# Patient Record
Sex: MALE | Race: WHITE | NOT HISPANIC OR LATINO | Employment: FULL TIME | URBAN - METROPOLITAN AREA
[De-identification: names, ages, dates, MRNs, and addresses within clinical notes are randomized per-mention and may not be internally consistent; named-entity substitution may affect disease eponyms.]

---

## 2019-09-06 ENCOUNTER — HOSPITAL ENCOUNTER (EMERGENCY)
Facility: CLINIC | Age: 34
Discharge: HOME | End: 2019-09-06
Admitting: PHYSICIAN ASSISTANT
Payer: COMMERCIAL

## 2019-09-06 VITALS
WEIGHT: 175 LBS | HEIGHT: 72 IN | RESPIRATION RATE: 18 BRPM | BODY MASS INDEX: 23.7 KG/M2 | TEMPERATURE: 98.2 F | HEART RATE: 89 BPM | OXYGEN SATURATION: 100 % | SYSTOLIC BLOOD PRESSURE: 145 MMHG | DIASTOLIC BLOOD PRESSURE: 86 MMHG

## 2019-09-06 DIAGNOSIS — S81.811A LACERATION OF RIGHT LOWER EXTREMITY, INITIAL ENCOUNTER: ICD-10-CM

## 2019-09-06 PROCEDURE — 90471 IMMUNIZATION ADMIN: CPT

## 2019-09-06 PROCEDURE — 90715 TDAP VACCINE 7 YRS/> IM: CPT | Performed by: PHYSICIAN ASSISTANT

## 2019-09-06 PROCEDURE — 12001 RPR S/N/AX/GEN/TRNK 2.5CM/<: CPT

## 2019-09-06 PROCEDURE — 99283 EMERGENCY DEPT VISIT LOW MDM: CPT | Mod: 25

## 2019-09-06 PROCEDURE — 25000128 H RX IP 250 OP 636: Performed by: PHYSICIAN ASSISTANT

## 2019-09-06 RX ADMIN — CLOSTRIDIUM TETANI TOXOID ANTIGEN (FORMALDEHYDE INACTIVATED), CORYNEBACTERIUM DIPHTHERIAE TOXOID ANTIGEN (FORMALDEHYDE INACTIVATED), BORDETELLA PERTUSSIS TOXOID ANTIGEN (GLUTARALDEHYDE INACTIVATED), BORDETELLA PERTUSSIS FILAMENTOUS HEMAGGLUTININ ANTIGEN (FORMALDEHYDE INACTIVATED), BORDETELLA PERTUSSIS PERTACTIN ANTIGEN, AND BORDETELLA PERTUSSIS FIMBRIAE 2/3 ANTIGEN 0.5 ML: 5; 2; 2.5; 5; 3; 5 INJECTION, SUSPENSION INTRAMUSCULAR at 19:27

## 2019-09-06 ASSESSMENT — ENCOUNTER SYMPTOMS: WOUND: 1

## 2019-09-06 ASSESSMENT — MIFFLIN-ST. JEOR: SCORE: 1771.79

## 2019-09-06 NOTE — ED AVS SNAPSHOT
Emergency Department  [address] [place] [address]  Phone:  [phone]  Fax:  [phone]                                    Khoa Danielle   MRN: [id number]    Department:   Emergency Department   Date of Visit:  9/6/2019           After Visit Summary Signature Page    I have received my discharge instructions, and my questions have been answered. I have discussed any challenges I see with this plan with the nurse or doctor.    ..........................................................................................................................................  Patient/Patient Representative Signature      ..........................................................................................................................................  Patient Representative Print Name and Relationship to Patient    ..................................................               ................................................  Date                                   Time    ..........................................................................................................................................  Reviewed by Signature/Title    ...................................................              ..............................................  Date                                               Time          22EPIC Rev 08/18

## 2019-09-07 NOTE — DISCHARGE INSTRUCTIONS
Watch the area surrounding the wound for signs of infection which can include increased redness, drainage, fevers, or swelling. Inspect the area daily. No swimming or baths for the next 3-5 days, showering is ok. Go to primary in 10-14 days for stitches/staple removal. Apply antibacterial ointment such as bacitracin to the wound daily.     Discharge Instructions  Laceration (Cut)    You were seen today for a laceration (cut).  Your doctor examined your laceration for any problems such a buried foreign body (like glass, a splinter, or gravel), or injury to blood vessels, tendons, and nerves.  Your doctor may have also rinsed and/or scrubbed your laceration to help prevent an infection.  Your laceration may have been closed with glue, staples or sutures (stitches).      It may not be possible to find all problems with your laceration on the first visit, and we can't always prevent infections.  Antibiotics are only given when the benefit is more than the risk, and don't prevent all infections. Some lacerations are too high risk to close, and are left open to heal.  All lacerations, no matter how expertly repaired, will cause scarring.    Return to the Emergency Department right away if:  You have more redness, swelling, pain, drainage (pus), a bad smell, or red streaking from your laceration.    You have a fever of 101oF or more.  You have bleeding that you can t stop at home. If your cut starts to bleed, hold pressure on the bleeding area with a clean cloth or put pressure over the bandage.  If the bleeding doesn t stop after using constant pressure for 30 minutes, you should return to the Emergency Department for further treatment.  An area past the laceration is cool, pale, or blue compared with the other side, or has a slower return of color when squeezed.  Your dressing seems too tight or starts to get uncomfortable or painful.  You have loss of normal function or use of an area, such as being unable to straighten  "or bend a finger normally.  You have a numb area past the laceration.    Return to the Emergency Department or see your regular doctor if:  The laceration starts to come open.   You have something coming out of the cut or a feeling that there is something in the laceration.  Your wound will not heal, or keeps breaking open. There can always be glass, wood, dirt or other things in any wound.  They won t always show up, even on x-rays.  If a wound doesn t heal, this may be why, and it is important to follow-up with your regular doctor.    Home Care:  Take your dressing off in 12 hours, or as instructed by your doctor, to check your laceration. Remove the dressing sooner if it seems too tight or painful, or if it is getting numb, tingly, or pale past the dressing.  Gently wash your laceration 2 times a day with clean cloth and soap.   It is okay to shower, but do not let the laceration soak in water.    If your laceration was closed with wound adhesive or strips: pat it dry and leave it open to the air.   For all other repairs: after you wash your laceration, or at least 2 times a day, apply bacitracin or other antibiotic ointment to the laceration, then cover it with a Band-Aid  or gauze.  Keep the laceration clean. Wear gloves or other protective clothing if you are around dirt.    Follow-up:  You need to follow-up with your regular doctor in 10-14 days.  Your sutures or staples need to be removed in 10-14 days. Schedule an appointment with your regular doctor to have this done.    Scars:  To help minimize scarring:  Wear sunscreen over the healed laceration when out in the sun.  Massage the area regularly.  You may use Vitamin E oil.  Wait a year.  Most scars will start to fade within a year.    Probiotics: If you have been given an antibiotic, you may want to also take a probiotic pill or eat yogurt with live cultures. Probiotics have \"good bacteria\" to help your intestines stay healthy. Studies have shown that " probiotics help prevent diarrhea and other intestine problems (including C. diff infection) when you take antibiotics. You can buy these without a prescription in the pharmacy section of the store.     If you were given a prescription for medicine here today, be sure to read all of the information (including the package insert) that comes with your prescription.  This will include important information about the medicine, its side effects, and any warnings that you need to know about.  The pharmacist who fills the prescription can provide more information and answer questions you may have about the medicine.  If you have questions or concerns that the pharmacist cannot address, please call or return to the Emergency Department.     Opioid Medication Information    Pain medications are among the most commonly prescribed medicines, so we are including this information for all our patients. If you did not receive pain medication or get a prescription for pain medicine, you can ignore it.     You may have been given a prescription for an opioid (narcotic) pain medicine and/or have received a pain medicine while here in the Emergency Department. These medicines can make you drowsy or impaired. You must not drive, operate dangerous equipment, or engage in any other dangerous activities while taking these medications. If you drive while taking these medications, you could be arrested for DUI, or driving under the influence. Do not drink any alcohol while you are taking these medications.     Opioid pain medications can cause addiction. If you have a history of chemical dependency of any type, you are at a higher risk of becoming addicted to pain medications.  Only take these prescribed medications to treat your pain when all other options have been tried. Take it for as short a time and as few doses as possible. Store your pain pills in a secure place, as they are frequently stolen and provide a dangerous opportunity for  children or visitors in your house to start abusing these powerful medications. We will not replace any lost or stolen medicine.  As soon as your pain is better, you should flush all your remaining medication.     Many prescription pain medications contain Tylenol  (acetaminophen), including Vicodin , Tylenol #3 , Norco , Lortab , and Percocet .  You should not take any extra pills of Tylenol  if you are using these prescription medications or you can get very sick.  Do not ever take more than 3000 mg of acetaminophen in any 24 hour period.    All opioids tend to cause constipation. Drink plenty of water and eat foods that have a lot of fiber, such as fruits, vegetables, prune juice, apple juice and high fiber cereal.  Take a laxative if you don t move your bowels at least every other day. Miralax , Milk of Magnesia, Colace , or Senna  can be used to keep you regular.      Remember that you can always come back to the Emergency Department if you are not able to see your regular doctor in the amount of time listed above, if you get any new symptoms, or if there is anything that worries you.
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
## 2022-02-23 ENCOUNTER — VIRTUAL VISIT (OUTPATIENT)
Dept: UROLOGY | Facility: CLINIC | Age: 37
End: 2022-02-23
Payer: COMMERCIAL

## 2022-02-23 DIAGNOSIS — Z30.09 ENCOUNTER FOR VASECTOMY COUNSELING: Primary | ICD-10-CM

## 2022-02-23 PROCEDURE — 99202 OFFICE O/P NEW SF 15 MIN: CPT | Mod: GT | Performed by: UROLOGY

## 2022-02-23 NOTE — PROGRESS NOTES
Khoa Danielle is a 36 year old male who is being evaluated via a video visit.      Video-Visit Details    Type of service:  Video Visit    Video Start Time: 1:34 PM  Video End Time: 1:47 PM    Originating Location (pt. Location): work    Distant Location (provider location):  [facility] AND [facility]     Platform used for Video Visit: [employer]    CC: Desires sterilization, consult for vasectomy.    HPI: Khoa Danielle is a 36 year old male with 2 children, and he is intersted in getting a vasectomy for sterilization.      No voiding problems.  No history of  trauma.  No hematuria  Normal sexual function.  No history of bleeding problems.    PMH:   Past Medical History:   Diagnosis Date     DNS (deviated nasal septum)    history of septoplasty, ~10 years ago.  3rd molars out.    FAMILY HX:   Family History   Problem Relation Age of Onset     Heart Disease Paternal Grandfather         heart  attack and multiple bypass     Diabetes Paternal Grandfather      Hypertension Paternal Grandfather      Diabetes Maternal Grandfather      Alcohol/Drug Maternal Grandfather      Breast Cancer Maternal Grandmother       No history of coagulopathies.    ALLERGIES:    No Known Allergies    MEDS:   No prescription medications at this time.    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     REVIEW OF SYMPTOMS:   Denies testicular pain, ED, ejaculatory problems, rashes in the groin, easy bruising or bleeding.   No constitutional, eye, ENT, heart, lung, GI, musculoskeletal, skin, neurologic, psychiatric, endocrine or hematologic complaints.       GENERAL PHYSICAL EXAM:   Exam  General- Alert, oriented, nad.  Pleasant and conversant.  Eyes- anicteric, EOMI.  Resps- normal, non-labored.  No  cough  Abdomen-  nondistended.   exam- deferred.   Neurological - no tremors  Skin - no discoloration/ lesions noted  Psychiatric - no anxiety, alert & oriented.       The rest of a comprehensive physical examination is deferred due to video visit restrictions.       I discussed with him at length the risks and benefits of the procedure. He understands that this is a sterilization procedure, and not reversible contraception. He understands that reversals, while possible, are not guaranteed to work and fairly complex. I discussed with him the option of sperm cryopreservation.     I stressed that he continues to be fertile in the post-operative period, and that he should continue using other contraceptive methods, such as a condom, until he obtains a semen analysis and we review the results to confirm success of the procedure and infertility. I also stressed to him that recanalization and pregnancy can occur in about 1 per thousand cases, possibly more even after we clear him with a semen analysis showing no motile sperm. I counseled him on the eliezer-operative risks of bleeding, infection, pain.  I described to him post-vasectomy pain syndrome that can occur in about 1 to 2% of men undergoing vasectomy.     We also discussed recovery times (typically days if no complications) and post-operative care including use of ice packs, pain medication and wound care.    Plan:  Schedule vasectomy procedure in clinic.       Additional Coding Information:    Problems:  One acute uncomplicated illness or injury    Data Reviewed  Minimal or none    Level of risk:   low risk (e.g., OTC medication or observation, minor surgery without risks)    Time spent:  13 minutes spent on the date of the encounter doing chart review, history and exam, documentation and further activities as noted above. This included the video chat time above.

## 2022-03-13 ENCOUNTER — HEALTH MAINTENANCE LETTER (OUTPATIENT)
Age: 37
End: 2022-03-13

## 2022-03-23 ENCOUNTER — OFFICE VISIT (OUTPATIENT)
Dept: UROLOGY | Facility: CLINIC | Age: 37
End: 2022-03-23
Payer: COMMERCIAL

## 2022-03-23 VITALS — DIASTOLIC BLOOD PRESSURE: 75 MMHG | SYSTOLIC BLOOD PRESSURE: 123 MMHG | OXYGEN SATURATION: 98 % | HEART RATE: 70 BPM

## 2022-03-23 DIAGNOSIS — Z30.2 ENCOUNTER FOR VASECTOMY: Primary | ICD-10-CM

## 2022-03-23 PROCEDURE — 55250 REMOVAL OF SPERM DUCT(S): CPT | Performed by: UROLOGY

## 2022-03-23 NOTE — NURSING NOTE
Khoa Danielle's goals for this visit include:   Chief Complaint   Patient presents with     Sterilization       He requests these members of his care team be copied on today's visit information:     PCP: No Ref-Primary, Physician    Referring Provider:  Mick Aragon MD  [address]  [place] [address]    /75 (BP Location: Right arm, Patient Position: Sitting, Cuff Size: Adult Regular)   Pulse 70   SpO2 98%     Do you need any medication refills at today's visit?     Tanisha Rae LPN on 3/23/2022 at 3:27 PM

## 2022-03-23 NOTE — PROGRESS NOTES
Procedure: Vasectomy bilateral.   Anesthesia: Local lidocaine injection by surgeon.  Surgeon: GODFREY Aragon M.D.   Assistant:  Tanisha Rae    Description of procedure: After the patient received education material regarding vasectomy, including risks and benefits, we proceeded with obtaining consent and proceeded with the surgery. He understands that there is a risk of late failure from recanalization. He understands that he is fertile until he has completed one or more semen analyses and he is given clearance from us for unprotected intercourse.  He understands that we will contact regarding the results but it is his responsibility to make sure he is cleared before having unprotected intercourse.  I have discussed with him other risks including bleeding, infection, acute and possible long-term pain.    The right vas was ligated first.  This was done using the standard technique by grasping it with a three-finger  and lifting it up to the skin.  A small amount of lidocaine was infiltrated into the skin and vasal sheath.  The skin was punctured and dilated bluntly using the scalpel-less dissector.  The sheath was identified and a rigid clamp was passed around the vas which was then lifted out of the incision.  The vas was cleaned off from the deferential vessels and the sheath, and cautery was used to divide the vas between mosquitos.  A small segment was removed and discarded.  The lumen of the vas was cannulated to confirm its identity, and the lumens of both ends were cauterized.  Pen cautery was used sparingly/as needed for minor bleeders.  A facial interposition was then performed with a single suture of 4-0 Monocryl. The skin defect was closed with a 4-0 chromic interrupted suture after confirming adequate hemostasis.       We then turned our attention to the left side and a similar technique was performed. This involved division, excision of a segment, cautery of the lumens, and fascial  interposition.    There were no hematomas noted at the end of the procedure.  The patient tolerated the procedure well.    He was advised to return for a post vasectomy semen check in 2-3 months, and that he is not sterile and must continue to use contraception until we tell him otherwise (based on follow-up semen specimen(s)).    Plan:  -Post vasectomy semen analysis in 2-3 months   -ice packs to scrotum X 24h  -shower ok tomorrow  -OTC analgesics recommended     Ab RUFF

## 2022-03-23 NOTE — PATIENT INSTRUCTIONS
Vasectomy Post-Op Care Instructions     You may go home after the procedure is completed. There may be some pain in your groin for 3 or 4 days after the operation. Some blood or yellow liquid may ooze from the cuts on the outside. The area around the cuts may swell and bruise. The sutures will dissolve on their own and do not require removal by the physician.    The first 48 hours after the procedure are crucial to healing. Generally, you may feel very good the day after the procedure, but that does not mean it is time to go back to normal activities. Resuming normal activities too soon is likely to cause internal bleeding and lots of pain.      Your provider may advise the following ways to care for yourself after the procedure:    Put an ice bag or package of frozen peas covered by a thin towel over the scrotum after the procedure. Leave the ice on the area for 30 minutes and then take it off for 30 minutes. Do this off and on for at least 24 hours.      Avoid all heavy lifting (greater than 10 pounds) for at least one week.    Wear a jockstrap or tight-fitting underwear for approximately one week to support the scrotum (testicles) and help reduce discomfort.    Take a pain reliever, such as Acetaminophen (Tylenol) or Ibuprofen (Advil), for any pain after the procedure. Your provider may prescribe a stronger pain medicine if it is needed.    You should be able to return to work in 48 hours, but strenuous activity should be avoided for two weeks.    Do not submerge the incision for 1 week following the procedure.    Ejaculation should be avoided for one week to allow the area to heal.    Follow-Up    You will need to have a negative post vasectomy analyses (no sperm seen) before you can discontinue birth control.    Semen Analysis   Schedule the post-vasectomy semen analysis three months after your vasectomy. You will need to ejaculate at least 20 times between your surgery and the first sample. Clinic staff will  contact your regarding your results via phone.    You will be given a container after the procedure. Collect the specimen at home by masturbation only (no lubrication, powders, saliva, or intercourse can be used) and bring it to the laboratory. You must have an appointment to drop off your specimen. The specimen needs to be delivered to the lab within 30-45 minutes.    Call [phone] with questions. For concerns or questions after hours or on weekends, please page the Urology Resident on-call: [phone].

## 2022-06-27 ENCOUNTER — TELEPHONE (OUTPATIENT)
Dept: UROLOGY | Facility: CLINIC | Age: 37
End: 2022-06-27

## 2022-06-27 ENCOUNTER — LAB (OUTPATIENT)
Dept: LAB | Facility: CLINIC | Age: 37
End: 2022-06-27
Payer: COMMERCIAL

## 2022-06-27 DIAGNOSIS — Z30.2 ENCOUNTER FOR VASECTOMY: Primary | ICD-10-CM

## 2022-06-27 DIAGNOSIS — Z30.2 ENCOUNTER FOR VASECTOMY: ICD-10-CM

## 2022-06-27 LAB — SEMEN ANALYSIS P VAS PNL: NORMAL

## 2022-06-27 PROCEDURE — 89321 SEMEN ANAL SPERM DETECTION: CPT

## 2022-06-27 NOTE — TELEPHONE ENCOUNTER
[facility] Call Center    Phone Message    May a detailed message be left on voicemail: yes     Reason for Call: Other: Satim called as there are questions regarding the semen analysis orders. Please give Satim a call to discuss     Action Taken: Message routed to:  Other: URO    Travel Screening: Not Applicable

## 2022-06-27 NOTE — TELEPHONE ENCOUNTER
Per lab, patient dropped off sample. Which is not how to do ROLAND semen analysis. Order placed for post vasectomy semen analysis.    Tanisha Rae LPN on 6/27/2022 at 3:48 PM

## 2022-06-30 NOTE — RESULT ENCOUNTER NOTE
Dear Devonte,     You are cleared for intercourse post-vasectomy.    Your semen analysis showed zero sperm.    Thank You!   Let me know if you have any questions.    Ab RUFF

## 2023-01-14 ENCOUNTER — HEALTH MAINTENANCE LETTER (OUTPATIENT)
Age: 38
End: 2023-01-14

## 2023-04-23 ENCOUNTER — HEALTH MAINTENANCE LETTER (OUTPATIENT)
Age: 38
End: 2023-04-23

## 2023-04-24 ASSESSMENT — ENCOUNTER SYMPTOMS
FREQUENCY: 0
NERVOUS/ANXIOUS: 0
ARTHRALGIAS: 0
FEVER: 0
NAUSEA: 0
HEMATOCHEZIA: 0
DYSURIA: 0
HEMATURIA: 0
PARESTHESIAS: 0
JOINT SWELLING: 0
HEADACHES: 0
MYALGIAS: 1
EYE PAIN: 0
HEARTBURN: 0
WEAKNESS: 0
ABDOMINAL PAIN: 0
PALPITATIONS: 0
DIZZINESS: 0
DIARRHEA: 0
SORE THROAT: 0
SHORTNESS OF BREATH: 0
CONSTIPATION: 0
CHILLS: 0
COUGH: 0

## 2023-04-26 ENCOUNTER — OFFICE VISIT (OUTPATIENT)
Dept: FAMILY MEDICINE | Facility: CLINIC | Age: 38
End: 2023-04-26
Payer: COMMERCIAL

## 2023-04-26 VITALS
WEIGHT: 179.5 LBS | OXYGEN SATURATION: 99 % | DIASTOLIC BLOOD PRESSURE: 66 MMHG | HEART RATE: 75 BPM | TEMPERATURE: 98.7 F | RESPIRATION RATE: 16 BRPM | SYSTOLIC BLOOD PRESSURE: 126 MMHG | HEIGHT: 72 IN | BODY MASS INDEX: 24.31 KG/M2

## 2023-04-26 DIAGNOSIS — Z13.220 SCREENING FOR CHOLESTEROL LEVEL: ICD-10-CM

## 2023-04-26 DIAGNOSIS — Z13.1 SCREENING FOR DIABETES MELLITUS: ICD-10-CM

## 2023-04-26 DIAGNOSIS — M79.661 PAIN OF RIGHT LOWER LEG: ICD-10-CM

## 2023-04-26 DIAGNOSIS — Z00.00 ENCOUNTER FOR ANNUAL PHYSICAL EXAM: Primary | ICD-10-CM

## 2023-04-26 PROCEDURE — 99213 OFFICE O/P EST LOW 20 MIN: CPT | Mod: 25

## 2023-04-26 PROCEDURE — 99385 PREV VISIT NEW AGE 18-39: CPT

## 2023-04-26 ASSESSMENT — ENCOUNTER SYMPTOMS
SHORTNESS OF BREATH: 0
HEARTBURN: 0
HEADACHES: 0
FEVER: 0
DIZZINESS: 0
EYE PAIN: 0
HEMATURIA: 0
CONSTIPATION: 0
JOINT SWELLING: 0
MYALGIAS: 1
ABDOMINAL PAIN: 0
CHILLS: 0
DYSURIA: 0
DIARRHEA: 0
ARTHRALGIAS: 0
WEAKNESS: 0
SORE THROAT: 0
FREQUENCY: 0
PARESTHESIAS: 0
NAUSEA: 0
PALPITATIONS: 0
HEMATOCHEZIA: 0
NERVOUS/ANXIOUS: 0
COUGH: 0

## 2023-04-26 ASSESSMENT — PAIN SCALES - GENERAL: PAINLEVEL: NO PAIN (0)

## 2023-04-26 NOTE — PROGRESS NOTES
SUBJECTIVE:   CC: Devonte is an 37 year old who presents for preventative health visit.       4/26/2023     4:29 PM   Additional Questions   Roomed by Alejandra   Accompanied by Self     Patient has been advised of split billing requirements and indicates understanding: Yes  Healthy Habits:     Getting at least 3 servings of Calcium per day:  Yes    Bi-annual eye exam:  NO    Dental care twice a year:  Yes    Sleep apnea or symptoms of sleep apnea:  None    Diet:  Regular (no restrictions)    Frequency of exercise:  4-5 days/week    Duration of exercise:  15-30 minutes    Taking medications regularly:  Yes    Medication side effects:  None    PHQ-2 Total Score: 0    Additional concerns today:  No    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 Likes the flexibility.  Family: 10 - year- Leilani-teacher. 2 children- 3 yo, 5 yo.  Exercise: running, cycling, walking around- hiking.  Diet: picky 6 year old. Bonner foods. 5 meals/day. Tries to get vegetables.    Today's PHQ-2 Score:       4/24/2023    12:05 PM   PHQ-2 ( 1999 [employer])   Q1: Little interest or pleasure in doing things 0   Q2: Feeling down, depressed or hopeless 0   PHQ-2 Score 0   Q1: Little interest or pleasure in doing things Not at all    Not at all   Q2: Feeling down, depressed or hopeless Not at all    Not at all   PHQ-2 Score 0    0       Have you ever done Advance Care Planning? (For example, a Health Directive, POLST, or a discussion with a medical provider or your loved ones about your wishes): No, advance care planning information given to patient to review.  Patient declined advance care planning discussion at this time.    Social History     Tobacco Use     Smoking status: Never     Passive exposure: Never     Smokeless tobacco: Never   Vaping Use     Vaping status: Never Used     Passive vaping exposure: Yes   Substance Use Topics     Alcohol use: Yes     Comment: occasionally             4/24/2023    12:05 PM   Alcohol Use   Prescreen: >3  drinks/day or >7 drinks/week? No          View : No data to display.                Last PSA: No results found for: PSA    Reviewed and updated as needed this visit by clinical staff   Tobacco  Allergies  Meds              Reviewed and updated as needed this visit by Provider      Review of Systems   Constitutional: Negative for chills and fever.   HENT: Positive for congestion. Negative for ear pain, hearing loss and sore throat.    Eyes: Negative for pain and visual disturbance.   Respiratory: Negative for cough and shortness of breath.    Cardiovascular: Negative for chest pain, palpitations and peripheral edema.   Gastrointestinal: Negative for abdominal pain, constipation, diarrhea, heartburn, hematochezia and nausea.   Genitourinary: Negative for dysuria, frequency, genital sores, hematuria, impotence, penile discharge and urgency.   Musculoskeletal: Positive for myalgias. Negative for arthralgias and joint swelling.   Skin: Negative for rash.   Neurological: Negative for dizziness, weakness, headaches and paresthesias.   Psychiatric/Behavioral: Negative for mood changes. The patient is not nervous/anxious.      OBJECTIVE:   /66   Pulse 75   Temp 98.7  F (37.1  C)   Resp 16   Ht 1.829 m (6')   Wt 81.4 kg (179 lb 8 oz)   SpO2 99%   BMI 24.34 kg/m      Physical Exam  GENERAL: healthy, alert and no distress  EYES: Eyes grossly normal to inspection, PERRL and conjunctivae and sclerae normal  HENT: ear canals and TM's normal, nose and mouth without ulcers or lesions  NECK: no adenopathy, no asymmetry, masses, or scars and thyroid normal to palpation  RESP: lungs clear to auscultation - no rales, rhonchi or wheezes  CV: regular rate and rhythm, normal S1 S2, no S3 or S4, no murmur, click or rub, no peripheral edema and peripheral pulses strong  ABDOMEN: soft, nontender, no hepatosplenomegaly, no masses and bowel sounds normal  MS: no gross musculoskeletal defects noted, no edema  SKIN: no suspicious  lesions or rashes  NEURO: Normal strength and tone, mentation intact and speech normal  PSYCH: mentation appears normal, affect normal/bright      ASSESSMENT/PLAN:   (Z00.00) Encounter for annual physical exam  (primary encounter diagnosis)  Discussed appropriate patient goal, which is for him to be able to maintain activity despite his chronic right leg pain    (Z13.220) Screening for cholesterol level  Plan: Lipid panel reflex to direct LDL Fasting    (Z13.1) Screening for diabetes mellitus  Plan: Glucose    (M79.661) Pain of right lower leg  Plan: Spine  Referral, XR Lumbar Spine 2/3         Views      Patient has been advised of split billing requirements and indicates understanding: Yes      COUNSELING:   Reviewed preventive health counseling, as reflected in patient instructions       Regular exercise       Healthy diet/nutrition        He reports that he has never smoked. He has never been exposed to tobacco smoke. He has never used smokeless tobacco.      Harley Lundberg NP  [facility]

## 2023-04-26 NOTE — PATIENT INSTRUCTIONS
Intracardiac echocardiography catheter removed intact.   Schedule your scan with radiology: [phone]

## 2023-05-01 ENCOUNTER — LAB (OUTPATIENT)
Dept: LAB | Facility: CLINIC | Age: 38
End: 2023-05-01
Payer: COMMERCIAL

## 2023-05-01 ENCOUNTER — HOSPITAL ENCOUNTER (OUTPATIENT)
Dept: GENERAL RADIOLOGY | Facility: CLINIC | Age: 38
Discharge: HOME | End: 2023-05-01
Payer: COMMERCIAL

## 2023-05-01 DIAGNOSIS — Z13.220 SCREENING FOR CHOLESTEROL LEVEL: ICD-10-CM

## 2023-05-01 DIAGNOSIS — M79.661 PAIN OF RIGHT LOWER LEG: ICD-10-CM

## 2023-05-01 DIAGNOSIS — Z13.1 SCREENING FOR DIABETES MELLITUS: ICD-10-CM

## 2023-05-01 LAB
CHOLEST SERPL-MCNC: 200 MG/DL
FASTING STATUS PATIENT QL REPORTED: YES
GLUCOSE SERPL-MCNC: 109 MG/DL (ref 70–99)
HDLC SERPL-MCNC: 50 MG/DL
LDLC SERPL CALC-MCNC: 118 MG/DL
NONHDLC SERPL-MCNC: 150 MG/DL
TRIGL SERPL-MCNC: 162 MG/DL

## 2023-05-01 PROCEDURE — 72100 X-RAY EXAM L-S SPINE 2/3 VWS: CPT

## 2023-05-01 PROCEDURE — 80061 LIPID PANEL: CPT

## 2023-05-01 PROCEDURE — 82947 ASSAY GLUCOSE BLOOD QUANT: CPT

## 2023-05-01 PROCEDURE — 36415 COLL VENOUS BLD VENIPUNCTURE: CPT

## 2023-05-08 ENCOUNTER — OFFICE VISIT (OUTPATIENT)
Dept: PHYSICAL MEDICINE AND REHAB | Facility: CLINIC | Age: 38
End: 2023-05-08
Payer: COMMERCIAL

## 2023-05-08 VITALS
DIASTOLIC BLOOD PRESSURE: 79 MMHG | RESPIRATION RATE: 16 BRPM | SYSTOLIC BLOOD PRESSURE: 130 MMHG | OXYGEN SATURATION: 99 % | HEART RATE: 64 BPM | TEMPERATURE: 98.6 F

## 2023-05-08 DIAGNOSIS — M79.661 PAIN OF RIGHT LOWER LEG: Primary | ICD-10-CM

## 2023-05-08 PROCEDURE — 99203 OFFICE O/P NEW LOW 30 MIN: CPT | Performed by: PHYSICAL MEDICINE & REHABILITATION

## 2023-05-08 ASSESSMENT — PAIN SCALES - GENERAL: PAINLEVEL: MILD PAIN (3)

## 2023-05-08 NOTE — PROGRESS NOTES
Patient seen at the request of Harley Lundberg NP for an opinion and evaluation of right leg pain.      HISTORY OF PRESENT ILLNESS:  Khoa Danielle is a 37 year old male who presents with a chief complaint of leg pain.     Pain began many years ago disease and running since middle school and developed pain in his legs.  He has been an avid endurance runner and athlete.  He participated in a triathlon/Ironman about 10 years ago.  He states that he has had chronic, ongoing pain affecting his right> left leg when he runs.  Additional management has included multiple courses of physical therapy, chiropractic care, and evaluation by a podiatrist.  He has tried prior PT through [facility]. he is also scheduled to start physical therapy tomorrow through [facility] in [place] with Marjorie Liu. He had an Xray lumbar spine which showed mild L5-S1 disc height loss and had some questions/concerns regarding that.     The pain is localized to the right >>left (90:10%) anterior shin at the proximal tibialis anterior muscle- he reports pain travelling along the IT band/lateral thigh down to the level of the leg; he denies numbness/tingling; described as dull and aching in nature. Pain is reported as 2/10 at rest today and up to 4/10 at worst in the last week. Symptoms are worse notably with running; and improved with rest. He does not report pain-related sleep disturbance.     Functional limitations: Can run up to 4miles before needing to sit or rest due to pain but has pain that develops almost immediately on running. He has tried multiple different types of shoes. He would like to train for trail half marathons. He likes to cross country ski. Does yoga which he can tolerate fine.     PRIOR INJURIES/TREATMENT:   Ice/Heat: none  Brace: None  Physical Therapy:   [facility]    Starting [facility] with Joceline Orantes      - Current Pain Medications -    None    - Prior/Trialed Pain Medications -   Tylenol prn  OTC NSAIDs prn     Prior Procedures:  Date    Procedure   Improvement (%)  None              Prior Related Surgery: None   Other (acupuncture, OMT, CMM, TENS, DME, etc.):   Massage therapy with some relief   Chiropractor helps occasionally with some adjustments       IMAGING - reviewed   05/01/23 - Mild L5-S1 disc height loss.     Review Of Systems:  I am responding to those symptoms which are directly relevant to the specific indication for my consultation. I recommend that the patient follow up with their primary or referring provider to pursue any other symptoms which may be of concern.       Medical History:  He  has a past medical history of DNS (deviated nasal septum).     He  has a past surgical history that includes molar extractions; Septoplasty, turbinoplasty, combined (07/29/2013); and vasectomy.    Family History  His family history includes Alcohol/Drug in his maternal grandfather; Breast Cancer in his maternal grandmother; Diabetes in his maternal grandfather and paternal grandfather; Heart Disease in his paternal grandfather; Hyperlipidemia in his mother; Hypertension in his paternal grandfather.      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   Current living situation: Lives with wife. x2 kids (4yo and 7yo)  He  reports that he has never smoked. He has never been exposed to tobacco smoke. He has never used smokeless tobacco. He reports current alcohol use. He reports that he does not use drugs.        Current Medications:   He currently has no medications in their medication list.     Allergies:   No Known Allergies    PHYSICAL EXAMINATION:  /79   Pulse 64   Temp 98.6  F (37  C)   Resp 16   SpO2 99%    General: Pleasant, straightforward, WDWN individual.  Mental Status: Pleasant, direct, appropriate mood and affect  Resp: breathing is unlabored without audible wheeze  Vascular: No visible cyanosis, no venous stasis  changes  Heme: No visible ecchymosis or erythema on extremities  Skin: No notable rash    Neurologic:  Strength: All major muscle groups of the bilateral lower extremities have normal and symmetric muscle strength     Sensation: SILT in lower extremities bilaterally L3-S1     DTRs: bilateral lower extremity stretch reflexes are equal and symmetric   No Clonus     Gait: reveals normal pat and stride    Musculoskeletal:  On dual leg/neutral stance and when laying supine in-toeing and internal hip rotation noted.     Full lumbar ROM. No gross pelvic obliquity. Facet loading equivocal. Non-tender to palpation in the lumbar spine. Negative SLR    Right Hip ROM full and painfree. Negative Obers bilaterally. Non tender along ITB or greater troch        Right SI joint maneuvers: TTP SIJ (sacral sulcus/PSIS), Celso (pt pointing) pos, Anjum neg, Yeoman's neg    ASSESSMENT:  Khoa Danielle is a pleasant 37 year old male who presents with to the [facility] for chronic right leg pain with exercise/exertion - notably running. He does have some mechanical symptoms of low back pain that are relatively non-specific and not as pervasive. X-ray findings are not particularly significant for DDD but unclear if there is any stenosis impingement that could further contribute to radicular pain in the lower extremities.     PLAN:  - PT scheduled at [facility] tomorrow   - Reviewed and interpreted Xray with patient  - Can consider additional imaging with MRI lumbar spine depending on his response to PT. If no clear spine generator may benefit from additional sports medicine evaluation.   - RTC x4-6 weeks of compliance w/ PT program and HEP.     Ready to learn, no apparent learning barriers.  Education provided on treatment plan according to patient's preferred learning style.  Patient verbalizes understanding.   __________________________________  Edgard Menendez MD  Physical Medicine & Rehabilitation        40 minutes  spent by me on the date of the encounter doing chart review, review of outside records, review of test results, interpretation of tests, patient visit and documentation

## 2023-05-08 NOTE — LETTER
5/8/2023       RE: Khoa Danielle  [address]  [place] [address]       Dear Colleague,    Thank you for referring your patient, Khoa Danielle, to the [facility] [place] at [facility]. Please see a copy of my visit note below.    Patient seen at the request of Harley Lundberg NP for an opinion and evaluation of right leg pain.      HISTORY OF PRESENT ILLNESS:  Khoa Danielle is a 37 year old male who presents with a chief complaint of leg pain.     Pain began many years ago disease and running since middle school and developed pain in his legs.  He has been an avid endurance runner and athlete.  He participated in a triathlon/Ironman about 10 years ago.  He states that he has had chronic, ongoing pain affecting his right> left leg when he runs.  Additional management has included multiple courses of physical therapy, chiropractic care, and evaluation by a podiatrist.  He has tried prior PT through [facility]. he is also scheduled to start physical therapy tomorrow through [facility] in [place] with Marjorie Liu. He had an Xray lumbar spine which showed mild L5-S1 disc height loss and had some questions/concerns regarding that.     The pain is localized to the right >>left (90:10%) anterior shin at the proximal tibialis anterior muscle- he reports pain travelling along the IT band/lateral thigh down to the level of the leg; he denies numbness/tingling; described as dull and aching in nature. Pain is reported as 2/10 at rest today and up to 4/10 at worst in the last week. Symptoms are worse notably with running; and improved with rest. He does not report pain-related sleep disturbance.     Functional limitations: Can run up to 4miles before needing to sit or rest due to pain but has pain that develops almost immediately on running. He has tried multiple different types of shoes.  He would like to train for trail half marathons. He likes to cross country ski. Does yoga which he can tolerate fine.     PRIOR INJURIES/TREATMENT:   Ice/Heat: none  Brace: None  Physical Therapy:   Remote PT - multiple sessions through [facility], [facility],    Starting PT [facility] with Joceline Orantes      - Current Pain Medications -   None    - Prior/Trialed Pain Medications -   Tylenol prn  OTC NSAIDs prn     Prior Procedures:  Date    Procedure   Improvement (%)  None              Prior Related Surgery: None   Other (acupuncture, OMT, CMM, TENS, DME, etc.):   Massage therapy with some relief   Chiropractor helps occasionally with some adjustments       IMAGING - reviewed   05/01/23 - Mild L5-S1 disc height loss.     Review Of Systems:  I am responding to those symptoms which are directly relevant to the specific indication for my consultation. I recommend that the patient follow up with their primary or referring provider to pursue any other symptoms which may be of concern.       Medical History:  He  has a past medical history of DNS (deviated nasal septum).     He  has a past surgical history that includes molar extractions; Septoplasty, turbinoplasty, combined (07/29/2013); and vasectomy.    Family History  His family history includes Alcohol/Drug in his maternal grandfather; Breast Cancer in his maternal grandmother; Diabetes in his maternal grandfather and paternal grandfather; Heart Disease in his paternal grandfather; Hyperlipidemia in his mother; Hypertension in his paternal grandfather.      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   Current living situation: Lives with wife. x2 kids (4yo and 5yo)  He  reports that he has never smoked. He has never been exposed to tobacco smoke. He has never used smokeless tobacco. He reports current alcohol use. He reports that he does not use drugs.        Current Medications:   He currently has no medications in their medication list.     Allergies:    No Known Allergies    PHYSICAL EXAMINATION:  /79   Pulse 64   Temp 98.6  F (37  C)   Resp 16   SpO2 99%    General: Pleasant, straightforward, WDWN individual.  Mental Status: Pleasant, direct, appropriate mood and affect  Resp: breathing is unlabored without audible wheeze  Vascular: No visible cyanosis, no venous stasis changes  Heme: No visible ecchymosis or erythema on extremities  Skin: No notable rash    Neurologic:  Strength: All major muscle groups of the bilateral lower extremities have normal and symmetric muscle strength     Sensation: SILT in lower extremities bilaterally L3-S1     DTRs: bilateral lower extremity stretch reflexes are equal and symmetric   No Clonus     Gait: reveals normal pat and stride    Musculoskeletal:  On dual leg/neutral stance and when laying supine in-toeing and internal hip rotation noted.     Full lumbar ROM. No gross pelvic obliquity. Facet loading equivocal. Non-tender to palpation in the lumbar spine. Negative SLR    Right Hip ROM full and painfree. Negative Obers bilaterally. Non tender along ITB or greater troch        Right SI joint maneuvers: TTP SIJ (sacral sulcus/PSIS), Celso (pt pointing) pos, Anjum neg, Yeoman's neg    ASSESSMENT:  Khoa Danielle is a pleasant 37 year old male who presents with to the [facility] for chronic right leg pain with exercise/exertion - notably running. He does have some mechanical symptoms of low back pain that are relatively non-specific and not as pervasive. X-ray findings are not particularly significant for DDD but unclear if there is any stenosis impingement that could further contribute to radicular pain in the lower extremities.     PLAN:  - PT scheduled at [facility] tomorrow   - Reviewed and interpreted Xray with patient  - Can consider additional imaging with MRI lumbar spine depending on his response to PT. If no clear spine generator may benefit from additional sports medicine evaluation.   - RTC  x4-6 weeks of compliance w/ PT program and HEP.     Ready to learn, no apparent learning barriers.  Education provided on treatment plan according to patient's preferred learning style.  Patient verbalizes understanding.   __________________________________        40 minutes spent by me on the date of the encounter doing chart review, review of outside records, review of test results, interpretation of tests, patient visit and documentation           Again, thank you for allowing me to participate in the care of your patient.      Sincerely,    Edgard Menendez MD

## 2023-09-07 NOTE — PROGRESS NOTES
"Devonte is a 38 year old who is being evaluated via a billable video visit.      Video-Visit Details    Type of service:  Video Visit     Originating Location (pt. Location): Home    Distant Location (provider location):  Off-site  Platform used for Video Visit: [employer]    *Due to technical difficulty, had to reconnect video call 3 times. Not able to get time on the call recorded.       PM&R Follow-Up Visit -     Date of Initial Visit: 5/8/2023 (with Dr. Menendez)  LOV: 5/8/2023 (with Dr. Menendez)  TD: 9/11/2023     Recall: Khoa Danielle is a 37 year old male who presents with a chief complaint of leg pain.      INTERVAL HISTORY:  Patient was last seen in clinic 5/8/2023 with Dr. Menendez. At that visit, the plan was to begin physical therapy.  An MRI of the lumbar spine was also ordered after he had completed some physical therapy.    Since last time he completed 5 sessions of physical therapy and has been working on the home exercise program.  He had an MRI of the lumbar spine done 9/8/2023.  He was seen via telehealth today to review the imaging.    Today, he describes pain which has been going on for over 10 years.  He has done a lot of distance running over the years and participated in a triathlon 10 years ago.  He has persistent pain traveling down the right low back/hip area down the side of his right leg, along the lateral right knee, into the lateral edge of the right foot.    Since last time he was seen in the clinic, he has been working on physical therapy at [facility] which he says has focused on core strengthening exercises.  He says the pain \"moves around\" at times.  The pain affects the lateral right leg, lateral left leg at times, and right posterior hip/low back.  He stopped his running program since being seen in the clinic in May.  He does not take any medication for pain.    He denies falls, weakness, bowel or bladder incontinence, saddle anesthesia, unintentional weight loss, fevers/chills, or night " sweats.       RECALL HISTORY OF PRESENT ILLNESS:  Khoa Danielle is a 37 year old male who presents with a chief complaint of leg pain.     Pain began many years ago disease and running since middle school and developed pain in his legs.  He has been an avid endurance runner and athlete.  He participated in a triathlon/Ironman about 10 years ago.  He states that he has had chronic, ongoing pain affecting his right> left leg when he runs.  Additional management has included multiple courses of physical therapy, chiropractic care, and evaluation by a podiatrist.  He has tried prior PT through [facility] and [facility]. he is also scheduled to start physical therapy tomorrow through [facility] PT clinic in [place] with Marjorie Liu. He had an Xray lumbar spine which showed mild L5-S1 disc height loss and had some questions/concerns regarding that.     The pain is localized to the right >>left (90:10%) anterior shin at the proximal tibialis anterior muscle- he reports pain travelling along the IT band/lateral thigh down to the level of the leg; he denies numbness/tingling; described as dull and aching in nature. Pain is reported as 2/10 at rest today and up to 4/10 at worst in the last week. Symptoms are worse notably with running; and improved with rest. He does not report pain-related sleep disturbance.     Functional limitations: Can run up to 4miles before needing to sit or rest due to pain but has pain that develops almost immediately on running. He has tried multiple different types of shoes. He would like to train for trail half marathons. He likes to cross country ski. Does yoga which he can tolerate fine.     PRIOR INJURIES/TREATMENT:   Ice/Heat: none  Brace: None    Physical Therapy:   Currently doing PT at [facility]  Remote PT - multiple sessions through [facility],    Starting PT [facility] with Joceline Orantes      - Current Pain Medications -   None    - Prior/Trialed Pain Medications -    Tylenol prn  ibuprofen  OTC NSAIDs prn     Prior Procedures:  Date    Procedure   Improvement (%)  None              Prior Related Surgery: None     Other (acupuncture, OMT, CMM, TENS, DME, etc.):   Massage therapy with some relief   Chiropractor helps occasionally with some adjustments       IMAGING - reviewed   05/01/23 - Mild L5-S1 disc height loss.     Review Of Systems:  I am responding to those symptoms which are directly relevant to the specific indication for my consultation. I recommend that the patient follow up with their primary or referring provider to pursue any other symptoms which may be of concern.       Medical History:  He has a past medical history of DNS (deviated nasal septum).     He has a past surgical history that includes molar extractions; Septoplasty, turbinoplasty, combined (07/29/2013); and vasectomy.    Family History  His family history includes Alcohol/Drug in his maternal grandfather; Breast Cancer in his maternal grandmother; Diabetes in his maternal grandfather and paternal grandfather; Heart Disease in his paternal grandfather; Hyperlipidemia in his mother; Hypertension in his paternal grandfather.      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   Current living situation: Lives with wife and 2 kids (2yo and 7yo)  He  reports that he has never smoked. He has never been exposed to tobacco smoke. He has never used smokeless tobacco. He reports current alcohol use. He reports that he does not use drugs.        Current Medications:   He currently has no medications in their medication list.     Allergies:   No Known Allergies    PHYSICAL EXAMINATION: *limited by nature of virtual visit   No vital signs taken for visit  --CONSTITUTIONAL: No acute distress.   --PSYCHIATRIC: The patient is awake, alert, oriented to person, place, time and answering questions appropriately with clear speech. Appropriate mood and affect   --MSK: Seated piriformis stretch test was negative on both  sides        ASSESSMENT:  Khoa Danielle is a pleasant 37 year old male who [facility]8/2023 with chronic R>L leg pain with exercise/exertion - notably running.  Since he was seen in the clinic in May with Dr. Menendez, he stopped his running program.  He started physical therapy and has been working on the home exercise program.  He has persistent pain radiating to the lower extremities, R>L.    MRI of the lumbar spine 9/8/2023 shows shallow disc bulge at L4-5.  There is a disc bulge with central disc protrusion and annular fissure at L5-S1.  There is no lumbar spinal canal or neuroforaminal stenosis.      PLAN:  -MRI of the lumbar spine was reviewed in detail with the patient.  There is a disc bulge with central disc protrusion at L5-S1, however there is no corresponding spinal canal or neuroforaminal stenosis.  We discussed that this could potentially be a source of the pain with dynamic motion.  He would still like to pursue an evaluation with sports medicine to rule out other causes.  The referral was entered.  -We discussed that an EMG of the bilateral lower extremities could be considered in the future.  He would like to hold off for now.  -We discussed epidural steroid injection (bilateral L5-S1 TFESI versus L5-S1 IL WIN) for therapeutic pain relief, and also potential diagnostic value.  He would like to hold off for now.  -RTC as needed      Ready to learn, no apparent learning barriers.  Education provided on treatment plan according to patient's preferred learning style.  Patient verbalizes understanding.   __________________________________  Ching De La Garza NP  Physical Medicine and Rehabilitation, Medical Spine         30 minutes spent by me on the date of the encounter doing chart review, review of outside records, review of test results, interpretation of tests, patient visit and documentation

## 2023-09-08 ENCOUNTER — HOSPITAL ENCOUNTER (OUTPATIENT)
Dept: MRI IMAGING | Facility: CLINIC | Age: 38
Discharge: HOME | End: 2023-09-08
Attending: PHYSICAL MEDICINE & REHABILITATION | Admitting: PHYSICAL MEDICINE & REHABILITATION
Payer: COMMERCIAL

## 2023-09-08 DIAGNOSIS — M79.661 PAIN OF RIGHT LOWER LEG: ICD-10-CM

## 2023-09-08 DIAGNOSIS — M54.16 LUMBAR RADICULAR PAIN: ICD-10-CM

## 2023-09-08 PROCEDURE — 72148 MRI LUMBAR SPINE W/O DYE: CPT

## 2023-09-08 PROCEDURE — 72148 MRI LUMBAR SPINE W/O DYE: CPT | Mod: 26 | Performed by: RADIOLOGY

## 2023-09-11 ENCOUNTER — VIRTUAL VISIT (OUTPATIENT)
Dept: PHYSICAL MEDICINE AND REHAB | Facility: CLINIC | Age: 38
End: 2023-09-11
Payer: COMMERCIAL

## 2023-09-11 VITALS — WEIGHT: 135 LBS | BODY MASS INDEX: 18.31 KG/M2

## 2023-09-11 DIAGNOSIS — M54.50 CHRONIC BILATERAL LOW BACK PAIN, UNSPECIFIED WHETHER SCIATICA PRESENT: ICD-10-CM

## 2023-09-11 DIAGNOSIS — G89.29 CHRONIC BILATERAL LOW BACK PAIN, UNSPECIFIED WHETHER SCIATICA PRESENT: ICD-10-CM

## 2023-09-11 DIAGNOSIS — M79.604 PAIN IN BOTH LOWER EXTREMITIES: Primary | ICD-10-CM

## 2023-09-11 DIAGNOSIS — M79.605 PAIN IN BOTH LOWER EXTREMITIES: Primary | ICD-10-CM

## 2023-09-11 PROCEDURE — 99214 OFFICE O/P EST MOD 30 MIN: CPT | Mod: VID | Performed by: NURSE PRACTITIONER

## 2023-09-11 NOTE — LETTER
"9/11/2023       RE: Khoa Danielle  [address]  [place] [address]     Dear Colleague,    Thank you for referring your patient, Khoa Danielle, to the [facility] [place] at [facility]. Please see a copy of my visit note below.    Devonte is a 38 year old who is being evaluated via a billable video visit.      Video-Visit Details    Type of service:  Video Visit     Originating Location (pt. Location): Home    Distant Location (provider location):  Off-site  Platform used for Video Visit: [employer]    *Due to technical difficulty, had to reconnect video call 3 times. Not able to get time on the call recorded.       PM&R Follow-Up Visit -     Date of Initial Visit: 5/8/2023 (with Dr. Menendez)  LOV: 5/8/2023 (with Dr. Menendez)  TD: 9/11/2023     Recall: Khoa Danielle is a 37 year old male who presents with a chief complaint of leg pain.      INTERVAL HISTORY:  Patient was last seen in clinic 5/8/2023 with Dr. Menendez. At that visit, the plan was to begin physical therapy.  An MRI of the lumbar spine was also ordered after he had completed some physical therapy.    Since last time he completed 5 sessions of physical therapy and has been working on the home exercise program.  He had an MRI of the lumbar spine done 9/8/2023.  He was seen via telehealth today to review the imaging.    Today, he describes pain which has been going on for over 10 years.  He has done a lot of distance running over the years and participated in a triathlon 10 years ago.  He has persistent pain traveling down the right low back/hip area down the side of his right leg, along the lateral right knee, into the lateral edge of the right foot.    Since last time he was seen in the clinic, he has been working on physical therapy at [facility] which he says has focused on core strengthening exercises.  He says the pain \"moves around\" at times.  The " pain affects the lateral right leg, lateral left leg at times, and right posterior hip/low back.  He stopped his running program since being seen in the clinic in May.  He does not take any medication for pain.    He denies falls, weakness, bowel or bladder incontinence, saddle anesthesia, unintentional weight loss, fevers/chills, or night sweats.       RECALL HISTORY OF PRESENT ILLNESS:  Khoa Danielle is a 37 year old male who presents with a chief complaint of leg pain.     Pain began many years ago disease and running since middle school and developed pain in his legs.  He has been an avid endurance runner and athlete.  He participated in a triathlon/Ironman about 10 years ago.  He states that he has had chronic, ongoing pain affecting his right> left leg when he runs.  Additional management has included multiple courses of physical therapy, chiropractic care, and evaluation by a podiatrist.  He has tried prior PT through [facility] and [facility]. he is also scheduled to start physical therapy tomorrow through [facility] PT clinic in [place] with Marjorie Liu. He had an Xray lumbar spine which showed mild L5-S1 disc height loss and had some questions/concerns regarding that.     The pain is localized to the right >>left (90:10%) anterior shin at the proximal tibialis anterior muscle- he reports pain travelling along the IT band/lateral thigh down to the level of the leg; he denies numbness/tingling; described as dull and aching in nature. Pain is reported as 2/10 at rest today and up to 4/10 at worst in the last week. Symptoms are worse notably with running; and improved with rest. He does not report pain-related sleep disturbance.     Functional limitations: Can run up to 4miles before needing to sit or rest due to pain but has pain that develops almost immediately on running. He has tried multiple different types of shoes. He would like to train for trail half marathons. He likes to cross country ski. Does  yoga which he can tolerate fine.     PRIOR INJURIES/TREATMENT:   Ice/Heat: none  Brace: None    Physical Therapy:   Currently doing PT at [facility]  [facility],    Starting PT [facility] with Joceline Lamberto      - Current Pain Medications -   None    - Prior/Trialed Pain Medications -   Tylenol prn  ibuprofen  OTC NSAIDs prn     Prior Procedures:  Date    Procedure   Improvement (%)  None              Prior Related Surgery: None     Other (acupuncture, OMT, CMM, TENS, DME, etc.):   Massage therapy with some relief   Chiropractor helps occasionally with some adjustments       IMAGING - reviewed   05/01/23 - Mild L5-S1 disc height loss.     Review Of Systems:  I am responding to those symptoms which are directly relevant to the specific indication for my consultation. I recommend that the patient follow up with their primary or referring provider to pursue any other symptoms which may be of concern.       Medical History:  He has a past medical history of DNS (deviated nasal septum).     He has a past surgical history that includes molar extractions; Septoplasty, turbinoplasty, combined (07/29/2013); and vasectomy.    Family History  His family history includes Alcohol/Drug in his maternal grandfather; Breast Cancer in his maternal grandmother; Diabetes in his maternal grandfather and paternal grandfather; Heart Disease in his paternal grandfather; Hyperlipidemia in his mother; Hypertension in his paternal grandfather.      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   Current living situation: Lives with wife and 2 kids (4yo and 5yo)  He  reports that he has never smoked. He has never been exposed to tobacco smoke. He has never used smokeless tobacco. He reports current alcohol use. He reports that he does not use drugs.        Current Medications:   He currently has no medications in their medication list.     Allergies:   No Known Allergies    PHYSICAL  EXAMINATION: *limited by nature of virtual visit   No vital signs taken for visit  --CONSTITUTIONAL: No acute distress.   --PSYCHIATRIC: The patient is awake, alert, oriented to person, place, time and answering questions appropriately with clear speech. Appropriate mood and affect   --MSK: Seated piriformis stretch test was negative on both sides        ASSESSMENT:  Khoa Danielle is a pleasant 37 year old male who presented to the [facility] 5/8/2023 with chronic R>L leg pain with exercise/exertion - notably running.  Since he was seen in the clinic in May with Dr. Menendez, he stopped his running program.  He started physical therapy and has been working on the home exercise program.  He has persistent pain radiating to the lower extremities, R>L.    MRI of the lumbar spine 9/8/2023 shows shallow disc bulge at L4-5.  There is a disc bulge with central disc protrusion and annular fissure at L5-S1.  There is no lumbar spinal canal or neuroforaminal stenosis.      PLAN:  -MRI of the lumbar spine was reviewed in detail with the patient.  There is a disc bulge with central disc protrusion at L5-S1, however there is no corresponding spinal canal or neuroforaminal stenosis.  We discussed that this could potentially be a source of the pain with dynamic motion.  He would still like to pursue an evaluation with sports medicine to rule out other causes.  The referral was entered.  -We discussed that an EMG of the bilateral lower extremities could be considered in the future.  He would like to hold off for now.  -We discussed epidural steroid injection (bilateral L5-S1 TFESI versus L5-S1 IL WIN) for therapeutic pain relief, and also potential diagnostic value.  He would like to hold off for now.  -RTC as needed      Ready to learn, no apparent learning barriers.  Education provided on treatment plan according to patient's preferred learning style.  Patient verbalizes understanding.    ____________________________        30 minutes spent by me on the date of the encounter doing chart review, review of outside records, review of test results, interpretation of tests, patient visit and documentation           Again, thank you for allowing me to participate in the care of your patient.      Sincerely,    ADOLFO Whittaker CNP

## 2024-06-30 ENCOUNTER — HEALTH MAINTENANCE LETTER (OUTPATIENT)
Age: 39
End: 2024-06-30